# Patient Record
Sex: MALE | Race: WHITE | Employment: FULL TIME | URBAN - METROPOLITAN AREA
[De-identification: names, ages, dates, MRNs, and addresses within clinical notes are randomized per-mention and may not be internally consistent; named-entity substitution may affect disease eponyms.]

---
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
## 2020-02-25 ENCOUNTER — LAB ENCOUNTER (OUTPATIENT)
Dept: LAB | Age: 20
End: 2020-02-25
Attending: FAMILY MEDICINE
Payer: COMMERCIAL

## 2020-02-25 ENCOUNTER — OFFICE VISIT (OUTPATIENT)
Dept: FAMILY MEDICINE CLINIC | Facility: CLINIC | Age: 20
End: 2020-02-25

## 2020-02-25 VITALS
WEIGHT: 206.19 LBS | HEART RATE: 83 BPM | SYSTOLIC BLOOD PRESSURE: 135 MMHG | BODY MASS INDEX: 32.36 KG/M2 | HEIGHT: 67 IN | DIASTOLIC BLOOD PRESSURE: 81 MMHG | TEMPERATURE: 98 F

## 2020-02-25 DIAGNOSIS — R25.1 EPISODES OF TREMBLING: ICD-10-CM

## 2020-02-25 DIAGNOSIS — R51.9 OCCIPITAL HEADACHE: ICD-10-CM

## 2020-02-25 DIAGNOSIS — G44.209 TENSION HEADACHE: Primary | ICD-10-CM

## 2020-02-25 DIAGNOSIS — R51.9 TEMPORAL HEADACHE: ICD-10-CM

## 2020-02-25 DIAGNOSIS — G47.8 UNREFRESHED BY SLEEP: ICD-10-CM

## 2020-02-25 DIAGNOSIS — R06.83 SNORING: ICD-10-CM

## 2020-02-25 DIAGNOSIS — Z72.820 POOR SLEEP: ICD-10-CM

## 2020-02-25 DIAGNOSIS — G44.209 TENSION HEADACHE: ICD-10-CM

## 2020-02-25 LAB
ALBUMIN SERPL-MCNC: 4.2 G/DL (ref 3.4–5)
ALBUMIN/GLOB SERPL: 1.3 {RATIO} (ref 1–2)
ALP LIVER SERPL-CCNC: 62 U/L (ref 45–117)
ALT SERPL-CCNC: 26 U/L (ref 16–61)
ANION GAP SERPL CALC-SCNC: 3 MMOL/L (ref 0–18)
AST SERPL-CCNC: 18 U/L (ref 15–37)
BASOPHILS # BLD AUTO: 0.06 X10(3) UL (ref 0–0.2)
BASOPHILS NFR BLD AUTO: 0.9 %
BILIRUB SERPL-MCNC: 0.5 MG/DL (ref 0.1–2)
BUN BLD-MCNC: 18 MG/DL (ref 7–18)
BUN/CREAT SERPL: 15 (ref 10–20)
CALCIUM BLD-MCNC: 9.2 MG/DL (ref 8.5–10.1)
CHLORIDE SERPL-SCNC: 105 MMOL/L (ref 98–112)
CO2 SERPL-SCNC: 33 MMOL/L (ref 21–32)
CREAT BLD-MCNC: 1.2 MG/DL (ref 0.7–1.3)
DEPRECATED RDW RBC AUTO: 39.2 FL (ref 35.1–46.3)
EOSINOPHIL # BLD AUTO: 0.11 X10(3) UL (ref 0–0.7)
EOSINOPHIL NFR BLD AUTO: 1.6 %
ERYTHROCYTE [DISTWIDTH] IN BLOOD BY AUTOMATED COUNT: 11.9 % (ref 11–15)
ERYTHROCYTE [SEDIMENTATION RATE] IN BLOOD: 6 MM/HR (ref 0–15)
GLOBULIN PLAS-MCNC: 3.3 G/DL (ref 2.8–4.4)
GLUCOSE BLD-MCNC: 85 MG/DL (ref 70–99)
HAV IGM SER QL: 2 MG/DL (ref 1.6–2.6)
HCT VFR BLD AUTO: 44.2 % (ref 39–53)
HGB BLD-MCNC: 15.7 G/DL (ref 13–17.5)
IMM GRANULOCYTES # BLD AUTO: 0.03 X10(3) UL (ref 0–1)
IMM GRANULOCYTES NFR BLD: 0.4 %
LYMPHOCYTES # BLD AUTO: 2.15 X10(3) UL (ref 1–4)
LYMPHOCYTES NFR BLD AUTO: 30.8 %
M PROTEIN MFR SERPL ELPH: 7.5 G/DL (ref 6.4–8.2)
MCH RBC QN AUTO: 32 PG (ref 26–34)
MCHC RBC AUTO-ENTMCNC: 35.5 G/DL (ref 31–37)
MCV RBC AUTO: 90.2 FL (ref 80–100)
MONOCYTES # BLD AUTO: 0.69 X10(3) UL (ref 0.1–1)
MONOCYTES NFR BLD AUTO: 9.9 %
NEUTROPHILS # BLD AUTO: 3.93 X10 (3) UL (ref 1.5–7.7)
NEUTROPHILS # BLD AUTO: 3.93 X10(3) UL (ref 1.5–7.7)
NEUTROPHILS NFR BLD AUTO: 56.4 %
OSMOLALITY SERPL CALC.SUM OF ELEC: 293 MOSM/KG (ref 275–295)
PATIENT FASTING Y/N/NP: NO
PLATELET # BLD AUTO: 281 10(3)UL (ref 150–450)
POTASSIUM SERPL-SCNC: 4.1 MMOL/L (ref 3.5–5.1)
RBC # BLD AUTO: 4.9 X10(6)UL (ref 4.3–5.7)
SODIUM SERPL-SCNC: 141 MMOL/L (ref 136–145)
TSI SER-ACNC: 1.39 MIU/ML (ref 0.36–3.74)
VIT B12 SERPL-MCNC: 584 PG/ML (ref 193–986)
WBC # BLD AUTO: 7 X10(3) UL (ref 4–11)

## 2020-02-25 PROCEDURE — 99204 OFFICE O/P NEW MOD 45 MIN: CPT | Performed by: FAMILY MEDICINE

## 2020-02-25 PROCEDURE — 82607 VITAMIN B-12: CPT

## 2020-02-25 PROCEDURE — 85652 RBC SED RATE AUTOMATED: CPT

## 2020-02-25 PROCEDURE — 85025 COMPLETE CBC W/AUTO DIFF WBC: CPT

## 2020-02-25 PROCEDURE — 80053 COMPREHEN METABOLIC PANEL: CPT

## 2020-02-25 PROCEDURE — 36415 COLL VENOUS BLD VENIPUNCTURE: CPT

## 2020-02-25 PROCEDURE — 84443 ASSAY THYROID STIM HORMONE: CPT

## 2020-02-25 PROCEDURE — 83735 ASSAY OF MAGNESIUM: CPT

## 2020-02-25 RX ORDER — CYCLOBENZAPRINE HCL 10 MG
10 TABLET ORAL NIGHTLY
Qty: 30 TABLET | Refills: 0 | Status: SHIPPED | OUTPATIENT
Start: 2020-02-25 | End: 2020-03-26

## 2020-02-25 NOTE — PROGRESS NOTES
Patient ID: Gold Khanna is a 20year old male. HPI  Patient presents with:  Headache: headaches for about a year  Tingling: both hands trembling and aches    Last seen by me in 2017. He works as a .      Pt gets bad headaches in his temp based on CDC (Boys, 2-20 Years) data.     BMI Readings from Last 6 Encounters:  02/25/20 : 32.30 kg/m²  04/06/17 : 26.88 kg/m² (91 %, Z= 1.36)*  08/02/16 : 25.03 kg/m² (86 %, Z= 1.09)*  04/05/16 : 24.48 kg/m² (85 %, Z= 1.02)*  08/31/15 : 23.67 kg/m² (83 %, rhinorrhea. THROAT: Oropharynx is clear without exudate. Eyes: Conjunctivae and EOM are normal.   Neck: Normal range of motion. Normal strength with shoulder shrug. Tenderness in the trapezius and cervical paraspinal muscles bilaterally.    Norbert Riosh make tired. Episodes of trembling  -     CBC WITH DIFFERENTIAL WITH PLATELET; Future  -     ASSAY, THYROID STIM HORMONE; Future  -     COMP METABOLIC PANEL (14); Future  -     MAGNESIUM;  Future  -     VITAMIN B12; Future  -     cyclobenzaprine 10 MG Ora

## 2020-04-10 ENCOUNTER — PATIENT MESSAGE (OUTPATIENT)
Dept: FAMILY MEDICINE CLINIC | Facility: CLINIC | Age: 20
End: 2020-04-10

## 2020-04-10 ENCOUNTER — TELEPHONE (OUTPATIENT)
Dept: OTHER | Age: 20
End: 2020-04-10

## 2020-04-10 DIAGNOSIS — Z20.822 CLOSE EXPOSURE TO 2019 NOVEL CORONAVIRUS: Primary | ICD-10-CM

## 2020-04-11 ENCOUNTER — TELEPHONE (OUTPATIENT)
Dept: FAMILY MEDICINE CLINIC | Facility: CLINIC | Age: 20
End: 2020-04-11

## 2020-04-11 NOTE — TELEPHONE ENCOUNTER
From: Marni Mosher  To: Valentino Bali, DO  Sent: 4/10/2020 9:42 PM CDT  Subject: Other    Maxine Floreskathy Choi my father tested positive for Covid-19 on Wednesday.  I wasn't showing symptoms but since yesterday morning I started with a sore throat then a cough a

## 2020-04-11 NOTE — TELEPHONE ENCOUNTER
Called and discussed with patient regarding Covid 19 test. Patient states that he feels nausea and has 2 bouts of diarrhea this morning, no blood in stool. Unable to check fever. No SOB. Mild cough. Supportive care and BRAST diet discussed with patient.  Ad

## 2020-04-11 NOTE — TELEPHONE ENCOUNTER
Dr. Linda Mayberry, please see patient email below and advise. Also sent [employer] message Dr. Linda Mayberry to alert to this encounter.       Subject Delivery           Other  4/10/2020 9:42 PM Reply    To: BOB Lowe      From: Júnior Bush      Created: 4/10/2020 9:42 PM

## 2020-04-11 NOTE — TELEPHONE ENCOUNTER
Called and discussed with patient regarding his symptoms. Patient states that his father has been hospitalized for positive Covid-19 in the past 2 days. Patient is living with his father and his younger brother.  Patient started to have sore throat and dry

## 2020-04-17 ENCOUNTER — TELEPHONE (OUTPATIENT)
Dept: FAMILY MEDICINE CLINIC | Facility: CLINIC | Age: 20
End: 2020-04-17

## 2020-04-17 NOTE — TELEPHONE ENCOUNTER
Patient was contacted to change appointment on 4/20 to phone visit. Per patient he usually has a co payment for office visits and if theres going to be a co payment for a phone visit, he'll rather do an in office visit. Please advice.

## 2020-04-18 NOTE — TELEPHONE ENCOUNTER
lmtcb  Advised if he was coming to for URI s/s, he will not be seen. I am not certain that there is an actual co pay at time of telephone visit but advised that the insurance will be charged for a telephone visit and any payment for that will be billed.

## 2020-04-18 NOTE — TELEPHONE ENCOUNTER
Patient does not want to do a telephone visit but wants to be seen instead. My medical assistant tried to get a hold of him but was unable.   If it is been 7 days of self quarantine or at least 3 days of no symptoms, which ever is longer and he feels fine

## 2020-04-18 NOTE — TELEPHONE ENCOUNTER
Pt states all he needs is a return to work note. Pt states this Wednesday 4/22/20 he will have quarantined for 14 days and 4/22/20 is when he would like to return to work    Pt has been symptom free since last Monday 4/13/20.     Please advise if pt need

## 2020-04-24 ENCOUNTER — VIRTUAL PHONE E/M (OUTPATIENT)
Dept: FAMILY MEDICINE CLINIC | Facility: CLINIC | Age: 20
End: 2020-04-24

## 2020-04-24 DIAGNOSIS — Z20.822 SUSPECTED COVID-19 VIRUS INFECTION: Primary | ICD-10-CM

## 2020-04-24 DIAGNOSIS — Z20.822 CLOSE EXPOSURE TO COVID-19 VIRUS: ICD-10-CM

## 2020-04-24 PROCEDURE — 99213 OFFICE O/P EST LOW 20 MIN: CPT | Performed by: FAMILY MEDICINE

## 2020-04-24 NOTE — PROGRESS NOTES
TELEPHONE VISIT PROGRESS NOTE  Todays date: 4/24/2020 1:55 PM        Most recent Nurse Triage message / Leon Cox message from patient:      Juanito Fuentes RN   Registered Nurse   Registered Nurse   Telephone Encounter   Signed   Encounter Date:  4/10/2020 Also some of these patients with possible COVID-19 infection or some type of other illness are too ill to be on the phone and would rather have a designated person speak for them and in that case we will be speaking to that designated person and all parti No [x]     • Allergic Rhinitis: Yes []     No [x]   • Anxiety:  Yes []     No [x]   • Asthma/COPD/other respiratory: Yes []     No [x]     • Diabetes: Yes []     No [x]      • Heart disease: Yes []     No [x]      • Travel: Yes []     No [x]      • Sick co go to ER or call 911 for worsening symptoms or acute distress. (NOTE: Not every complaint above will be related to the COVID-19 pandemic).   Shahana Gary,   4/24/2020

## (undated) NOTE — LETTER
4/24/2020              Divine Henderson        [id number] [facility]         To whom it may concern,    Divine Henderson is currently a patient under my medical care. Patient is doing well.   We did a telephone visit where he has no compl

## (undated) NOTE — MR AVS SNAPSHOT
[address], [address]  [address] [facility]  [phone]               Thank you for choosing us for your health care visit with Leidy Love DO.   We are glad to serve you and happy to provide you with this summary Fact Sheet: Healthy Active Living for Families    Healthy nutrition starts as early as infancy with breastfeeding. Once your baby begins eating solid foods, introduce nutritious foods early on and often.  Sometimes toddlers need to try a food 10 times befor